# Patient Record
Sex: MALE | URBAN - METROPOLITAN AREA
[De-identification: names, ages, dates, MRNs, and addresses within clinical notes are randomized per-mention and may not be internally consistent; named-entity substitution may affect disease eponyms.]

---

## 2018-11-30 ENCOUNTER — OFFICE VISIT (OUTPATIENT)
Dept: URBAN - METROPOLITAN AREA CLINIC 62 | Facility: CLINIC | Age: 68
End: 2018-11-30
Payer: MEDICARE

## 2018-11-30 DIAGNOSIS — H26.491 OTHER SECONDARY CATARACT, RIGHT EYE: Primary | ICD-10-CM

## 2018-11-30 DIAGNOSIS — H25.12 AGE-RELATED NUCLEAR CATARACT, LEFT EYE: ICD-10-CM

## 2018-11-30 PROCEDURE — 92014 COMPRE OPH EXAM EST PT 1/>: CPT | Performed by: OPHTHALMOLOGY

## 2018-11-30 ASSESSMENT — INTRAOCULAR PRESSURE
OD: 12
OS: 12

## 2018-11-30 ASSESSMENT — KERATOMETRY
OS: 44.38
OD: 44.00

## 2018-11-30 ASSESSMENT — VISUAL ACUITY
OD: 20/20
OS: 20/20

## 2018-11-30 NOTE — IMPRESSION/PLAN
Impression: Other secondary cataract, right eye: H26.491. Plan: Rec. observation. Consider laser if symptoms worsen.